# Patient Record
Sex: MALE | Race: WHITE | Employment: OTHER | URBAN - METROPOLITAN AREA
[De-identification: names, ages, dates, MRNs, and addresses within clinical notes are randomized per-mention and may not be internally consistent; named-entity substitution may affect disease eponyms.]

---

## 2018-02-12 ENCOUNTER — HOSPITAL ENCOUNTER (OUTPATIENT)
Dept: LAB | Age: 68
Discharge: HOME | End: 2018-02-12

## 2018-02-12 PROCEDURE — 88305 TISSUE EXAM BY PATHOLOGIST: CPT | Performed by: INTERNAL MEDICINE

## 2018-05-19 ENCOUNTER — APPOINTMENT (OUTPATIENT)
Dept: ULTRASOUND IMAGING | Age: 68
End: 2018-05-19
Attending: EMERGENCY MEDICINE
Payer: MEDICARE

## 2018-05-19 ENCOUNTER — HOSPITAL ENCOUNTER (EMERGENCY)
Age: 68
Discharge: HOME | End: 2018-05-19
Attending: EMERGENCY MEDICINE
Payer: MEDICARE

## 2018-05-19 ENCOUNTER — APPOINTMENT (OUTPATIENT)
Dept: CT IMAGING | Age: 68
End: 2018-05-19
Attending: EMERGENCY MEDICINE
Payer: MEDICARE

## 2018-05-19 VITALS
HEART RATE: 62 BPM | RESPIRATION RATE: 18 BRPM | TEMPERATURE: 97.6 F | HEIGHT: 71 IN | SYSTOLIC BLOOD PRESSURE: 129 MMHG | DIASTOLIC BLOOD PRESSURE: 69 MMHG | OXYGEN SATURATION: 95 % | WEIGHT: 262 LBS | BODY MASS INDEX: 36.68 KG/M2

## 2018-05-19 DIAGNOSIS — R10.13 ABDOMINAL PAIN, EPIGASTRIC: Primary | ICD-10-CM

## 2018-05-19 LAB
ALBUMIN SERPL-MCNC: 3.7 G/DL (ref 3.2–4.6)
ALBUMIN/GLOB SERPL: 0.9 {RATIO} (ref 1.2–3.5)
ALP SERPL-CCNC: 99 U/L (ref 50–136)
ALT SERPL-CCNC: 46 U/L (ref 12–65)
ANION GAP SERPL CALC-SCNC: 9 MMOL/L (ref 7–16)
AST SERPL-CCNC: 35 U/L (ref 15–37)
ATRIAL RATE: 58 BPM
BACTERIA URNS QL MICRO: 0 /HPF
BASOPHILS # BLD: 0.1 K/UL (ref 0–0.2)
BASOPHILS NFR BLD: 1 % (ref 0–2)
BILIRUB SERPL-MCNC: 0.7 MG/DL (ref 0.2–1.1)
BUN SERPL-MCNC: 47 MG/DL (ref 8–23)
CALCIUM SERPL-MCNC: 9.4 MG/DL (ref 8.3–10.4)
CALCULATED P AXIS, ECG09: 66 DEGREES
CALCULATED R AXIS, ECG10: -8 DEGREES
CALCULATED T AXIS, ECG11: 59 DEGREES
CASTS URNS QL MICRO: NORMAL /LPF
CHLORIDE SERPL-SCNC: 104 MMOL/L (ref 98–107)
CO2 SERPL-SCNC: 24 MMOL/L (ref 21–32)
CREAT SERPL-MCNC: 2.32 MG/DL (ref 0.8–1.5)
DIAGNOSIS, 93000: NORMAL
DIFFERENTIAL METHOD BLD: ABNORMAL
EOSINOPHIL # BLD: 0.4 K/UL (ref 0–0.8)
EOSINOPHIL NFR BLD: 5 % (ref 0.5–7.8)
EPI CELLS #/AREA URNS HPF: NORMAL /HPF
ERYTHROCYTE [DISTWIDTH] IN BLOOD BY AUTOMATED COUNT: 12.8 % (ref 11.9–14.6)
GLOBULIN SER CALC-MCNC: 4.1 G/DL (ref 2.3–3.5)
GLUCOSE SERPL-MCNC: 292 MG/DL (ref 65–100)
HCT VFR BLD AUTO: 41.1 % (ref 41.1–50.3)
HGB BLD-MCNC: 14.2 G/DL (ref 13.6–17.2)
IMM GRANULOCYTES # BLD: 0 K/UL (ref 0–0.5)
IMM GRANULOCYTES NFR BLD AUTO: 1 % (ref 0–5)
LIPASE SERPL-CCNC: 291 U/L (ref 73–393)
LYMPHOCYTES # BLD: 1.9 K/UL (ref 0.5–4.6)
LYMPHOCYTES NFR BLD: 26 % (ref 13–44)
MCH RBC QN AUTO: 33.1 PG (ref 26.1–32.9)
MCHC RBC AUTO-ENTMCNC: 34.5 G/DL (ref 31.4–35)
MCV RBC AUTO: 95.8 FL (ref 79.6–97.8)
MONOCYTES # BLD: 0.8 K/UL (ref 0.1–1.3)
MONOCYTES NFR BLD: 11 % (ref 4–12)
NEUTS SEG # BLD: 4.2 K/UL (ref 1.7–8.2)
NEUTS SEG NFR BLD: 56 % (ref 43–78)
P-R INTERVAL, ECG05: 218 MS
PLATELET # BLD AUTO: 186 K/UL (ref 150–450)
PMV BLD AUTO: 10.9 FL (ref 10.8–14.1)
POTASSIUM SERPL-SCNC: 5 MMOL/L (ref 3.5–5.1)
PROT SERPL-MCNC: 7.8 G/DL (ref 6.3–8.2)
Q-T INTERVAL, ECG07: 458 MS
QRS DURATION, ECG06: 172 MS
QTC CALCULATION (BEZET), ECG08: 449 MS
RBC # BLD AUTO: 4.29 M/UL (ref 4.23–5.67)
RBC #/AREA URNS HPF: NORMAL /HPF
SODIUM SERPL-SCNC: 137 MMOL/L (ref 136–145)
TROPONIN I SERPL-MCNC: <0.02 NG/ML (ref 0.02–0.05)
VENTRICULAR RATE, ECG03: 58 BPM
WBC # BLD AUTO: 7.3 K/UL (ref 4.3–11.1)
WBC URNS QL MICRO: NORMAL /HPF

## 2018-05-19 PROCEDURE — 74176 CT ABD & PELVIS W/O CONTRAST: CPT

## 2018-05-19 PROCEDURE — 83690 ASSAY OF LIPASE: CPT | Performed by: EMERGENCY MEDICINE

## 2018-05-19 PROCEDURE — 74011000250 HC RX REV CODE- 250: Performed by: EMERGENCY MEDICINE

## 2018-05-19 PROCEDURE — 76705 ECHO EXAM OF ABDOMEN: CPT

## 2018-05-19 PROCEDURE — 74011250637 HC RX REV CODE- 250/637: Performed by: EMERGENCY MEDICINE

## 2018-05-19 PROCEDURE — 81015 MICROSCOPIC EXAM OF URINE: CPT | Performed by: EMERGENCY MEDICINE

## 2018-05-19 PROCEDURE — 81003 URINALYSIS AUTO W/O SCOPE: CPT | Performed by: EMERGENCY MEDICINE

## 2018-05-19 PROCEDURE — 74011250636 HC RX REV CODE- 250/636: Performed by: EMERGENCY MEDICINE

## 2018-05-19 PROCEDURE — 96375 TX/PRO/DX INJ NEW DRUG ADDON: CPT | Performed by: EMERGENCY MEDICINE

## 2018-05-19 PROCEDURE — 74011636320 HC RX REV CODE- 636/320: Performed by: EMERGENCY MEDICINE

## 2018-05-19 PROCEDURE — 93005 ELECTROCARDIOGRAM TRACING: CPT | Performed by: EMERGENCY MEDICINE

## 2018-05-19 PROCEDURE — 80053 COMPREHEN METABOLIC PANEL: CPT | Performed by: EMERGENCY MEDICINE

## 2018-05-19 PROCEDURE — 96374 THER/PROPH/DIAG INJ IV PUSH: CPT | Performed by: EMERGENCY MEDICINE

## 2018-05-19 PROCEDURE — 84484 ASSAY OF TROPONIN QUANT: CPT | Performed by: EMERGENCY MEDICINE

## 2018-05-19 PROCEDURE — 96376 TX/PRO/DX INJ SAME DRUG ADON: CPT | Performed by: EMERGENCY MEDICINE

## 2018-05-19 PROCEDURE — 85025 COMPLETE CBC W/AUTO DIFF WBC: CPT | Performed by: EMERGENCY MEDICINE

## 2018-05-19 PROCEDURE — 99285 EMERGENCY DEPT VISIT HI MDM: CPT | Performed by: EMERGENCY MEDICINE

## 2018-05-19 RX ORDER — ONDANSETRON 2 MG/ML
4 INJECTION INTRAMUSCULAR; INTRAVENOUS
Status: COMPLETED | OUTPATIENT
Start: 2018-05-19 | End: 2018-05-19

## 2018-05-19 RX ORDER — HYDROMORPHONE HYDROCHLORIDE 1 MG/ML
1 INJECTION, SOLUTION INTRAMUSCULAR; INTRAVENOUS; SUBCUTANEOUS
Status: COMPLETED | OUTPATIENT
Start: 2018-05-19 | End: 2018-05-19

## 2018-05-19 RX ORDER — MORPHINE SULFATE 2 MG/ML
6 INJECTION, SOLUTION INTRAMUSCULAR; INTRAVENOUS
Status: COMPLETED | OUTPATIENT
Start: 2018-05-19 | End: 2018-05-19

## 2018-05-19 RX ORDER — HYDRALAZINE HYDROCHLORIDE 20 MG/ML
10 INJECTION INTRAMUSCULAR; INTRAVENOUS
Status: COMPLETED | OUTPATIENT
Start: 2018-05-19 | End: 2018-05-19

## 2018-05-19 RX ORDER — HYDROGEN PEROXIDE 3 %
20 SOLUTION, NON-ORAL MISCELLANEOUS DAILY
Qty: 20 CAP | Refills: 0 | Status: SHIPPED | OUTPATIENT
Start: 2018-05-19 | End: 2018-06-08

## 2018-05-19 RX ORDER — LIDOCAINE HYDROCHLORIDE 20 MG/ML
10 SOLUTION OROPHARYNGEAL ONCE
Status: COMPLETED | OUTPATIENT
Start: 2018-05-19 | End: 2018-05-19

## 2018-05-19 RX ADMIN — Medication 30 ML: at 15:45

## 2018-05-19 RX ADMIN — HYDROMORPHONE HYDROCHLORIDE 1 MG: 1 INJECTION, SOLUTION INTRAMUSCULAR; INTRAVENOUS; SUBCUTANEOUS at 12:56

## 2018-05-19 RX ADMIN — ONDANSETRON 4 MG: 2 INJECTION INTRAMUSCULAR; INTRAVENOUS at 11:37

## 2018-05-19 RX ADMIN — ONDANSETRON 4 MG: 2 INJECTION INTRAMUSCULAR; INTRAVENOUS at 12:56

## 2018-05-19 RX ADMIN — LIDOCAINE HYDROCHLORIDE 10 ML: 20 SOLUTION ORAL; TOPICAL at 15:45

## 2018-05-19 RX ADMIN — HYDRALAZINE HYDROCHLORIDE 10 MG: 20 INJECTION INTRAMUSCULAR; INTRAVENOUS at 11:40

## 2018-05-19 RX ADMIN — MORPHINE SULFATE 6 MG: 2 INJECTION, SOLUTION INTRAMUSCULAR; INTRAVENOUS at 11:39

## 2018-05-19 RX ADMIN — DIATRIZOATE MEGLUMINE AND DIATRIZOATE SODIUM 15 ML: 660; 100 LIQUID ORAL; RECTAL at 11:44

## 2018-05-19 NOTE — ED NOTES
I have reviewed discharge instructions with the patient. The patient verbalized understanding. Patient left ED via Discharge Method: ambulatory to Home with (wife). Opportunity for questions and clarification provided. Patient given 0 scripts. No e-sign        To continue your aftercare when you leave the hospital, you may receive an automated call from our care team to check in on how you are doing. This is a free service and part of our promise to provide the best care and service to meet your aftercare needs.  If you have questions, or wish to unsubscribe from this service please call [phone]. Thank you for Choosing our [facility] Emergency Department.

## 2018-05-19 NOTE — PROGRESS NOTES
Visit with patient in ER. Calm.     Dee Alexander, staff   C: [phone] /  [email or web address]

## 2018-05-19 NOTE — ED PROVIDER NOTES
HPI Comments: 18-year-old male presents with sudden onset epigastric aching/cramping pain since 4 AM.  He tried Jan[place] and Pepto-Bismol without improvement. He reports nausea without vomiting. Had a normal bowel movement last night without blood. No radiation of pain. Ate steak last night. No recent spicy foods. No history of ulcers or gallbladder disease. Has chronic kidney disease with baseline creatinine around 2. Has been on several recent antibiotics. Had Mohs procedure 3 weeks ago for basal cell carcinoma on his face and was given some antibiotics at that time. Was started on Cefdinir by PCP 3 days ago for bronchitis. No diarrhea. No ill contacts. Denies fevers or urinary symptoms. Denies chest pain. Patient is a 77 y.o. male presenting with abdominal pain. The history is provided by the patient and the spouse. Abdominal Pain    Associated symptoms include nausea. Pertinent negatives include no fever, no diarrhea, no vomiting, no dysuria, no headaches, no chest pain and no back pain.         Past Medical History:   Diagnosis Date    [facility]     Dr. Zacarias More Focal segmental glomerulosclerosis with nephrosis     Dr. Godwin Johnson HTN (hypertension), benign     Hx of colonic polyps     last colonoscopy Spring 2006 - clear    Hyperlipidemia     negative nuclear stress test July 2008       Past Surgical History:   Procedure Laterality Date    HX COLONOSCOPY  Spring 2006    Clear    HX COLONOSCOPY  02/12/2018    3 polyps    HX MALIGNANT SKIN LESION EXCISION  2018    basal cell - left nose/left lip         Family History:   Problem Relation Age of Onset    Hypertension Father     Heart Disease Father      CHF       Social History     Social History    Marital status:      Spouse name: Angela Dickey Number of children: N/A    Years of education: N/A     Occupational History     Retired     [employer]     Social History Main Topics    Smoking status: Former Smoker Quit date: 1/1/2002    Smokeless tobacco: Never Used    Alcohol use Yes    Drug use: Not on file    Sexual activity: Not on file     Other Topics Concern    Not on file     Social History Narrative    No family/social/cultural/Buddhism issues pertinent to care         ALLERGIES: Review of patient's allergies indicates no known allergies. Review of Systems   Constitutional: Negative for chills and fever. HENT: Negative for hearing loss. Eyes: Negative for visual disturbance. Respiratory: Negative for cough and shortness of breath. Cardiovascular: Negative for chest pain and palpitations. Gastrointestinal: Positive for abdominal pain and nausea. Negative for blood in stool, diarrhea and vomiting. Genitourinary: Negative for dysuria. Musculoskeletal: Negative for back pain. Skin: Negative for rash. Neurological: Negative for weakness and headaches. Psychiatric/Behavioral: Negative for confusion. Vitals:    05/19/18 1053 05/19/18 1104   BP: (!) 202/91 192/89   Pulse: 64    Resp: 18    Temp: 97.6 °F (36.4 °C)    SpO2: 98% 97%   Weight: 118.8 kg (262 lb)    Height: 5' 11\" (1.803 m)             Physical Exam   Constitutional: He appears well-developed and well-nourished. HENT:   Head: Normocephalic and atraumatic. Right Ear: External ear normal.   Left Ear: External ear normal.   Nose: Nose normal.   Mouth/Throat: Oropharynx is clear and moist.   Eyes: Conjunctivae are normal. Pupils are equal, round, and reactive to light. Neck: Normal range of motion. Neck supple. Cardiovascular: Regular rhythm, normal heart sounds and intact distal pulses. Pulmonary/Chest: Effort normal and breath sounds normal. No respiratory distress. He has no wheezes. Abdominal: Soft. He exhibits no distension. Bowel sounds are decreased. There is tenderness in the epigastric area. There is guarding. There is no rigidity and no rebound. Musculoskeletal: Normal range of motion.  He exhibits no edema. Neurological: He is alert. Skin: Skin is warm and dry. Psychiatric: Judgment normal.   Nursing note and vitals reviewed. MDM  Number of Diagnoses or Management Options  Diagnosis management comments: Parts of this document were created using dragon voice recognition software. The chart has been reviewed but errors may still be present. Creatinine near baseline. Labs otherwise unremarkable. BP elevated. Significant tenderness on exam.  Possible antibiotic associated gastritis. No prior abdominal surgeries. We'll obtain CT scan for further evaluation. Pain treated. 3:29 PM  Pain resolved after second dose of pain medication. Patient has no right upper quadrant tenderness, just focal to the epigastric region. Still could possibly be abx gastritis, but poorly functioning gallbladder is possibility due to gallbladder distention. Will place on Nexium. Advised follow-up with primary care physician and possible GI for further evaluation. Given strict return percussion. I discussed the results of all labs, procedures, radiographs, and treatments with the patient and available family. Treatment plan is agreed upon and the patient is ready for discharge. Questions about treatment in the ED and differential diagnosis of presenting condition were answered. Patient was given verbal discharge instructions including, but not limited to, importance of returning to the emergency department for any concern of worsening or continued symptoms. Instructions were given to follow up with a primary care provider or specialist within 1-2 days. Adverse effects of medications, if prescribed, were discussed and patient was advised to refrain from significant physical activity until followed up by primary care physician and to not drive or operate heavy machinery after taking any sedating substances.            Amount and/or Complexity of Data Reviewed  Clinical lab tests: ordered and reviewed (Results for orders placed or performed during the hospital encounter of 05/19/18  -CBC WITH AUTOMATED DIFF       Result                                            Value                         Ref Range                       WBC                                               7.3                           4.3 - 11.1 K/uL                 RBC                                               4.29                          4.23 - 5.67 M/uL                HGB                                               14.2                          13.6 - 17.2 g/dL                HCT                                               41.1                          41.1 - 50.3 %                   MCV                                               95.8                          79.6 - 97.8 FL                  MCH                                               33.1 (H)                      26.1 - 32.9 PG                  MCHC                                              34.5                          31.4 - 35.0 g/dL                RDW                                               12.8                          11.9 - 14.6 %                   PLATELET                                          186                           150 - 450 K/uL                  MPV                                               10.9                          10.8 - 14.1 FL                  DF                                                AUTOMATED                                                     NEUTROPHILS                                       56                            43 - 78 %                       LYMPHOCYTES                                       26                            13 - 44 %                       MONOCYTES                                         11                            4.0 - 12.0 %                    EOSINOPHILS                                       5                             0.5 - 7.8 %                     BASOPHILS                                         1 0.0 - 2.0 %                     IMMATURE GRANULOCYTES                             1                             0.0 - 5.0 %                     ABS. NEUTROPHILS                                  4.2                           1.7 - 8.2 K/UL                  ABS. LYMPHOCYTES                                  1.9                           0.5 - 4.6 K/UL                  ABS. MONOCYTES                                    0.8                           0.1 - 1.3 K/UL                  ABS. EOSINOPHILS                                  0.4                           0.0 - 0.8 K/UL                  ABS. BASOPHILS                                    0.1                           0.0 - 0.2 K/UL                  ABS. IMM.  GRANS.                                  0.0                           0.0 - 0.5 K/UL             -METABOLIC PANEL, COMPREHENSIVE       Result                                            Value                         Ref Range                       Sodium                                            137                           136 - 145 mmol/L                Potassium                                         5.0                           3.5 - 5.1 mmol/L                Chloride                                          104                           98 - 107 mmol/L                 CO2                                               24                            21 - 32 mmol/L                  Anion gap                                         9                             7 - 16 mmol/L                   Glucose                                           292 (H)                       65 - 100 mg/dL                  BUN                                               47 (H)                        8 - 23 MG/DL                    Creatinine                                        2.32 (H)                      0.8 - 1.5 MG/DL                 GFR est AA                                        36 (L) >60 ml/min/1.73m2               GFR est non-AA                                    30 (L)                        >60 ml/min/1.73m2               Calcium                                           9.4                           8.3 - 10.4 MG/DL                Bilirubin, total                                  0.7                           0.2 - 1.1 MG/DL                 ALT (SGPT)                                        46                            12 - 65 U/L                     AST (SGOT)                                        35                            15 - 37 U/L                     Alk. phosphatase                                  99                            50 - 136 U/L                    Protein, total                                    7.8                           6.3 - 8.2 g/dL                  Albumin                                           3.7                           3.2 - 4.6 g/dL                  Globulin                                          4.1 (H)                       2.3 - 3.5 g/dL                  A-G Ratio                                         0.9 (L)                       1.2 - 3.5                  -LIPASE       Result                                            Value                         Ref Range                       Lipase                                            291                           73 - 393 U/L               -URINE MICROSCOPIC       Result                                            Value                         Ref Range                       WBC                                               0-3                           0 /hpf                          RBC                                               0-3                           0 /hpf                          Epithelial cells                                  0-3                           0 /hpf                          Bacteria                                          0                             0 /hpf                          Casts                                             0-3                           0 /lpf                     )  Tests in the radiology section of CPT®: ordered and reviewed (Ct Abd Pelv Wo Cont    Result Date: 5/19/2018  CT of the Abdomen and Pelvis INDICATION:  Abdominal pain for several hours Multiple axial images were obtained through the abdomen and pelvis without IV contrast.  Radiation dose reduction techniques were used for this study: All CT scans performed at this facility use one or all of the following: Automated exposure control, adjustment of the mA and/or kVp according to patient's size, iterative reconstruction. FINDINGS: Abdomen CT:  The lung bases are clear. There are no lesions in the liver or spleen. Gallbladder is slightly distended, otherwise appears normal.  The adrenal glands and pancreas appear normal.  There is scarring in both kidneys. There is no definite renal mass. There are a few scattered renal calcifications. There is no hydronephrosis. There is no adenopathy. There are no inflammatory changes or fluid collections in the abdomen. Pelvis CT:  The appendix is normal in size. There are no inflammatory changes or fluid collections in the pelvis. There is no significant adenopathy or mass. There are no bony lesions. IMPRESSION: Mild gallbladder distention. If there is clinical concern for gallbladder origin of pain, consider ultrasound evaluation. [facility]    Result Date: 5/19/2018  Right Upper Quadrant  Ultrasound INDICATION:  Gastric pain, gallbladder distention. FINDINGS: There are no discrete lesions in the visualized portions of the liver or pancreas. There is no bile duct dilatation. The common bile duct measures 5 mm. Gallbladder is slightly distended otherwise normal.  No gallstones are seen. There is no pericholecystic fluid. The right kidney measures 9.4 cm in length. There is no hydronephrosis. Cortex kidney is lobulated.   There is no evidence of a renal mass. There is no ascites. IMPRESSION: Gallbladder distention.   No stones identified.     )  Tests in the medicine section of CPT®: ordered and reviewed          ED Course       Procedures

## 2018-05-19 NOTE — ED TRIAGE NOTES
Pt states he woke up around 4 am this morning with upper abdominal pain. Pt states the pain is now in the middle of his stomach and he feels distended. Last bowel movement was last night. Denies any vomiting or diarrhea.

## 2019-09-24 ENCOUNTER — HOSPITAL ENCOUNTER (OUTPATIENT)
Dept: SLEEP MEDICINE | Age: 69
Discharge: HOME | End: 2019-09-24
Payer: MEDICARE

## 2019-09-24 PROCEDURE — 95811 POLYSOM 6/>YRS CPAP 4/> PARM: CPT

## 2019-10-16 PROBLEM — E66.01 SEVERE OBESITY (HCC): Status: ACTIVE | Noted: 2019-10-16

## 2021-04-20 ENCOUNTER — HOSPITAL ENCOUNTER (OUTPATIENT)
Dept: LAB | Age: 71
Discharge: HOME | End: 2021-04-20

## 2021-04-20 PROCEDURE — 88305 TISSUE EXAM BY PATHOLOGIST: CPT

## 2023-08-10 NOTE — DISCHARGE INSTRUCTIONS
Abdominal Pain: Care Instructions  Your Care Instructions    Abdominal pain has many possible causes. Some aren't serious and get better on their own in a few days. Others need more testing and treatment. If your pain continues or gets worse, you need to be rechecked and may need more tests to find out what is wrong. You may need surgery to correct the problem. Don't ignore new symptoms, such as fever, nausea and vomiting, urination problems, pain that gets worse, and dizziness. These may be signs of a more serious problem. Your doctor may have recommended a follow-up visit in the next 8 to 12 hours. If you are not getting better, you may need more tests or treatment. The doctor has checked you carefully, but problems can develop later. If you notice any problems or new symptoms, get medical treatment right away. Follow-up care is a key part of your treatment and safety. Be sure to make and go to all appointments, and call your doctor if you are having problems. It's also a good idea to know your test results and keep a list of the medicines you take. How can you care for yourself at home? · Rest until you feel better. · To prevent dehydration, drink plenty of fluids, enough so that your urine is light yellow or clear like water. Choose water and other caffeine-free clear liquids until you feel better. If you have kidney, heart, or liver disease and have to limit fluids, talk with your doctor before you increase the amount of fluids you drink. · If your stomach is upset, eat mild foods, such as rice, dry toast or crackers, bananas, and applesauce. Try eating several small meals instead of two or three large ones. · Wait until 48 hours after all symptoms have gone away before you have spicy foods, alcohol, and drinks that contain caffeine. · Do not eat foods that are high in fat. · Avoid anti-inflammatory medicines such as aspirin, ibuprofen (Advil, Motrin), and naproxen (Aleve).  These can cause stomach upset. Talk to your doctor if you take daily aspirin for another health problem. When should you call for help? Call 911 anytime you think you may need emergency care. For example, call if:  ? · You passed out (lost consciousness). ? · You pass maroon or very bloody stools. ? · You vomit blood or what looks like coffee grounds. ? · You have new, severe belly pain. ?Call your doctor now or seek immediate medical care if:  ? · Your pain gets worse, especially if it becomes focused in one area of your belly. ? · You have a new or higher fever. ? · Your stools are black and look like tar, or they have streaks of blood. ? · You have unexpected vaginal bleeding. ? · You have symptoms of a urinary tract infection. These may include:  ¨ Pain when you urinate. ¨ Urinating more often than usual.  ¨ Blood in your urine. ? · You are dizzy or lightheaded, or you feel like you may faint. ? Watch closely for changes in your health, and be sure to contact your doctor if:  ? · You are not getting better after 1 day (24 hours). Where can you learn more? Go to http://marshall-ilene.info/. Enter S647 in the search box to learn more about \"Abdominal Pain: Care Instructions. \"  Current as of: March 20, 2017  Content Version: 11.4  © 5946-0268 [employer]. Care instructions adapted under license by [employer] (which disclaims liability or warranty for this information). If you have questions about a medical condition or this instruction, always ask your healthcare professional. [place] [address] any warranty or liability for your use of this information. No